# Patient Record
Sex: FEMALE | Race: WHITE | NOT HISPANIC OR LATINO
[De-identification: names, ages, dates, MRNs, and addresses within clinical notes are randomized per-mention and may not be internally consistent; named-entity substitution may affect disease eponyms.]

---

## 2021-05-05 ENCOUNTER — NEW PATIENT COMPREHENSIVE (OUTPATIENT)
Dept: URBAN - METROPOLITAN AREA CLINIC 48 | Facility: CLINIC | Age: 86
End: 2021-05-05

## 2021-05-05 DIAGNOSIS — H25.812: ICD-10-CM

## 2021-05-05 DIAGNOSIS — H31.011: ICD-10-CM

## 2021-05-05 PROCEDURE — 92134 CPTRZ OPH DX IMG PST SGM RTA: CPT

## 2021-05-05 PROCEDURE — 92004 COMPRE OPH EXAM NEW PT 1/>: CPT

## 2021-05-05 RX ORDER — ERYTHROMYCIN 5 MG/G
OINTMENT OPHTHALMIC EVERY EVENING
Start: 2021-05-05

## 2021-05-05 ASSESSMENT — KERATOMETRY
OD_K1POWER_DIOPTERS: 45.00
OS_AXISANGLE2_DEGREES: 90
OS_K2POWER_DIOPTERS: 43.50
OD_AXISANGLE_DEGREES: 090
OD_AXISANGLE2_DEGREES: 180
OS_K1POWER_DIOPTERS: 44.25
OS_AXISANGLE_DEGREES: 180
OD_K2POWER_DIOPTERS: 42.50

## 2021-05-05 ASSESSMENT — VISUAL ACUITY
OU_CC: 20/60+2
OS_GLARE: >20/400
OS_CC: 20/40-2
OU_CC: J1+
OS_CC: J1+

## 2021-05-05 ASSESSMENT — TONOMETRY
OD_IOP_MMHG: 10
OS_IOP_MMHG: 14

## 2021-05-05 NOTE — PATIENT DISCUSSION
VISUALLY SIGNIFICANT/ DEFERS: Informed patient that their cataract is visually significant and that surgery is recommended to improve patient's visual acuity and/or glare symptoms. RBAs of surgery discussed and questions answered. Patient defers surgery at this time. Will continue to monitor regularly for progression. Patient can call to schedule biometry and surgery within the next 6 months if desired.

## 2021-05-05 NOTE — PATIENT DISCUSSION
Recommended warm compresses 2-3x/day x 10 mins. Begin E-mycin eleanor qhs LLL until resolves (Written Rx given).  Patient to call in 2 weeks if no improvement to schedule possible excision with MD.

## 2021-08-25 ENCOUNTER — DILATED FUNDUS EXAM (OUTPATIENT)
Dept: URBAN - METROPOLITAN AREA CLINIC 50 | Facility: CLINIC | Age: 86
End: 2021-08-25

## 2021-08-25 DIAGNOSIS — H52.4: ICD-10-CM

## 2021-08-25 DIAGNOSIS — H25.812: ICD-10-CM

## 2021-08-25 PROCEDURE — 92014 COMPRE OPH EXAM EST PT 1/>: CPT

## 2021-08-25 PROCEDURE — 92015 DETERMINE REFRACTIVE STATE: CPT

## 2021-08-25 ASSESSMENT — TONOMETRY
OD_IOP_MMHG: 10
OS_IOP_MMHG: 13

## 2021-08-25 ASSESSMENT — KERATOMETRY
OD_K2POWER_DIOPTERS: 42.50
OS_AXISANGLE_DEGREES: 180
OD_AXISANGLE_DEGREES: 090
OS_K2POWER_DIOPTERS: 43.50
OD_AXISANGLE2_DEGREES: 180
OS_AXISANGLE2_DEGREES: 90
OD_K1POWER_DIOPTERS: 45.00
OS_K1POWER_DIOPTERS: 44.25

## 2021-08-25 ASSESSMENT — VISUAL ACUITY
OS_CC: 20/50
OU_CC: J1+
OU_CC: 20/50
OS_GLARE: 20/400

## 2022-02-08 ENCOUNTER — ESTABLISHED PATIENT (OUTPATIENT)
Dept: URBAN - METROPOLITAN AREA CLINIC 52 | Facility: CLINIC | Age: 87
End: 2022-02-08

## 2022-02-08 DIAGNOSIS — H00.015: ICD-10-CM

## 2022-02-08 DIAGNOSIS — H04.123: ICD-10-CM

## 2022-02-08 DIAGNOSIS — H01.006: ICD-10-CM

## 2022-02-08 DIAGNOSIS — H01.003: ICD-10-CM

## 2022-02-08 PROCEDURE — 92012 INTRM OPH EXAM EST PATIENT: CPT

## 2022-02-08 ASSESSMENT — KERATOMETRY
OD_K1POWER_DIOPTERS: 45.00
OD_AXISANGLE_DEGREES: 090
OS_AXISANGLE2_DEGREES: 90
OS_AXISANGLE_DEGREES: 180
OD_K2POWER_DIOPTERS: 42.50
OS_K1POWER_DIOPTERS: 44.25
OD_AXISANGLE2_DEGREES: 180
OS_K2POWER_DIOPTERS: 43.50

## 2022-02-08 ASSESSMENT — TONOMETRY
OS_IOP_MMHG: 14
OD_IOP_MMHG: 14

## 2022-02-08 ASSESSMENT — VISUAL ACUITY
OS_PH: 20/40
OS_CC: 20/60

## 2022-02-23 ENCOUNTER — ESTABLISHED PATIENT (OUTPATIENT)
Dept: URBAN - METROPOLITAN AREA CLINIC 50 | Facility: CLINIC | Age: 87
End: 2022-02-23

## 2022-02-23 DIAGNOSIS — D31.32: ICD-10-CM

## 2022-02-23 DIAGNOSIS — H16.223: ICD-10-CM

## 2022-02-23 DIAGNOSIS — H25.812: ICD-10-CM

## 2022-02-23 DIAGNOSIS — H43.812: ICD-10-CM

## 2022-02-23 DIAGNOSIS — H18.412: ICD-10-CM

## 2022-02-23 PROCEDURE — 92134 CPTRZ OPH DX IMG PST SGM RTA: CPT

## 2022-02-23 PROCEDURE — 92014 COMPRE OPH EXAM EST PT 1/>: CPT

## 2022-02-23 ASSESSMENT — VISUAL ACUITY
OS_PH: 20/40-2
OU_CC: J2 @ 16IN
OS_CC: 20/60
OS_GLARE: 20/400

## 2022-02-23 ASSESSMENT — TONOMETRY
OS_IOP_MMHG: 13
OD_IOP_MMHG: 12

## 2022-02-23 NOTE — PATIENT DISCUSSION
Erythromycin ointment sent to patients pharmacy to use BID for 7-10days. D/w patient that if hordeolum doesn't resolve with this regiment we can prescribe an oral antibiotic.

## 2022-04-07 ENCOUNTER — DIAGNOSTICS ONLY (OUTPATIENT)
Dept: URBAN - METROPOLITAN AREA CLINIC 50 | Facility: CLINIC | Age: 87
End: 2022-04-07

## 2022-04-07 DIAGNOSIS — H25.812: ICD-10-CM

## 2022-04-07 PROCEDURE — 92025IOL CORNEAL TOPOGRAPHY PREMIUM IOL

## 2022-04-07 PROCEDURE — 92136 OPHTHALMIC BIOMETRY: CPT

## 2022-04-07 ASSESSMENT — KERATOMETRY
OS_AXISANGLE_DEGREES: 009
OS_K2POWER_DIOPTERS: 42.62
OS_K1POWER_DIOPTERS: 43.87
OS_AXISANGLE2_DEGREES: 99

## 2022-05-11 ENCOUNTER — PRE-OP/H&P (OUTPATIENT)
Dept: URBAN - METROPOLITAN AREA CLINIC 50 | Facility: CLINIC | Age: 87
End: 2022-05-11

## 2022-05-11 DIAGNOSIS — H25.812: ICD-10-CM

## 2022-05-11 PROCEDURE — PREOP PRE OP VISIT

## 2022-05-11 PROCEDURE — 92134 CPTRZ OPH DX IMG PST SGM RTA: CPT

## 2022-05-11 ASSESSMENT — TONOMETRY
OS_IOP_MMHG: 14
OD_IOP_MMHG: 14

## 2022-05-11 ASSESSMENT — KERATOMETRY
OS_AXISANGLE2_DEGREES: 99
OS_K2POWER_DIOPTERS: 42.62
OS_K1POWER_DIOPTERS: 43.87
OS_AXISANGLE_DEGREES: 009

## 2022-05-11 ASSESSMENT — VISUAL ACUITY: OS_CC: 20/60

## 2022-05-11 NOTE — PATIENT DISCUSSION
CATARACT SURGERY PLANNER - TORIC IOL/+FEMTO: Phacoemulsification with IOL: Eye: OS|DOS: 05/24/2022|Model: [id number]|Power: +15.00|Target: PLANO|Femto: YES|Arcs: - @ 005 ; - @ 185|Axis: 005|Visc: DUET|Omidria: YES|10% Phenylephrine: YES|Epi-shugarcaine:YES|Phaco Setting: STANDARD|BSS+: NO|Trypan Blue: NO|CTR: NO|Olive Tip: NO|Atropine: NO|Pupilloplasty: NO|Notes: H/O STROKE. DEMENTIA. ADHESIVE TAPE ALLERGY. NIDDM. LATE ARRIVAL.

## 2022-05-23 ASSESSMENT — KERATOMETRY
OS_K1POWER_DIOPTERS: 43.87
OS_K2POWER_DIOPTERS: 42.62
OS_AXISANGLE2_DEGREES: 99
OS_AXISANGLE_DEGREES: 009

## 2022-05-24 ENCOUNTER — POST-OP (OUTPATIENT)
Dept: URBAN - METROPOLITAN AREA CLINIC 52 | Facility: CLINIC | Age: 87
End: 2022-05-24

## 2022-05-24 ENCOUNTER — SURGERY/PROCEDURE (OUTPATIENT)
Dept: URBAN - METROPOLITAN AREA SURGERY 16 | Facility: SURGERY | Age: 87
End: 2022-05-24

## 2022-05-24 DIAGNOSIS — Z98.42: ICD-10-CM

## 2022-05-24 DIAGNOSIS — H25.812: ICD-10-CM

## 2022-05-24 DIAGNOSIS — H53.001: ICD-10-CM

## 2022-05-24 PROCEDURE — 99199PSUP SUPERIOR VISION PACKAGE

## 2022-05-24 PROCEDURE — 66984 XCAPSL CTRC RMVL W/O ECP: CPT

## 2022-05-24 ASSESSMENT — TONOMETRY
OS_IOP_MMHG: 31
OS_IOP_MMHG: 10

## 2022-05-24 ASSESSMENT — VISUAL ACUITY: OS_SC: 20/70+1

## 2022-05-24 NOTE — PATIENT DISCUSSION
CATARACT SURGERY PLANNER - TORIC IOL/+FEMTO: Phacoemulsification with IOL: Eye: OS|DOS: 05/24/2022|Model: [id number]|Power: +15.00|Target: PLANO|Femto: YES|Arcs: - @ 005 ; - @ 185|Axis: 005|Visc: DUET|Omidria: YES|10% Phenylephrine: YES|Epi-shugarcaine:YES|Phaco Setting: STANDARD|BSS+: NO|Trypan Blue: NO|CTR: NO|Olive Tip: NO|Atropine: NO|Pupilloplasty: NO|Notes: H/O STROKE. DEMENTIA. ADHESIVE TAPE ALLERGY. NIDDM. LATE ARRIVAL.

## 2022-05-24 NOTE — PATIENT DISCUSSION
INCREASE PRED-MOXI- NEPAF TO 3 TIMES A DAY AND FOLLOW DROP SCHEDULE. D/C LID SCRUBS AT THIS TIME. F/U WITH DR. Nereyda Kim IN [facility].

## 2022-05-24 NOTE — PATIENT DISCUSSION
CATARACT SURGERY PLANNER - TORIC IOL/+FEMTO: Phacoemulsification with IOL: Eye: OS|DOS: 05/24/2022|Model: [id number]|Power: +15.00|Target: PLANO|Femto: YES|Arcs: - @ 005 ; - @ 185|Axis: 005|Visc: DUET|Omidria: YES|10% Phenylephrine: YES|Epi-shugarcaine:YES|Phaco Setting: STANDARD|BSS+: NO|Trypan Blue: NO|CTR: NO|Olive Tip: NO|Atropine: NO|Pupilloplasty: NO|Notes: H/O STROKE. DEMENTIA. ADHESIVE TAPE ALLERGY. NIDDM. LATE ARRIVAL.

## 2022-05-24 NOTE — PATIENT DISCUSSION
INCREASE PRED-MOXI- NEPAF TO 3 TIMES A DAY AND FOLLOW DROP SCHEDULE. D/C LID SCRUBS AT THIS TIME. F/U WITH DR. Melody Tello IN [facility].

## 2022-06-02 ENCOUNTER — POST-OP (OUTPATIENT)
Dept: URBAN - METROPOLITAN AREA CLINIC 50 | Facility: CLINIC | Age: 87
End: 2022-06-02

## 2022-06-02 DIAGNOSIS — Z98.42: ICD-10-CM

## 2022-06-02 ASSESSMENT — VISUAL ACUITY
OU_CC: J1+
OU_SC: J2
OS_SC: 20/30-1

## 2022-06-02 ASSESSMENT — TONOMETRY
OD_IOP_MMHG: 15
OS_IOP_MMHG: 16

## 2022-06-29 ENCOUNTER — POST-OP (OUTPATIENT)
Dept: URBAN - METROPOLITAN AREA CLINIC 50 | Facility: CLINIC | Age: 87
End: 2022-06-29

## 2022-06-29 DIAGNOSIS — Z98.42: ICD-10-CM

## 2022-06-29 DIAGNOSIS — H43.812: ICD-10-CM

## 2022-06-29 PROCEDURE — 99024 POSTOP FOLLOW-UP VISIT: CPT

## 2022-06-29 ASSESSMENT — TONOMETRY
OS_IOP_MMHG: 13
OD_IOP_MMHG: 11

## 2022-06-29 ASSESSMENT — VISUAL ACUITY
OS_SC: 20/25
OS_SC: J3 @ 16 IN

## 2022-10-26 ENCOUNTER — ESTABLISHED PATIENT (OUTPATIENT)
Dept: URBAN - METROPOLITAN AREA CLINIC 50 | Facility: CLINIC | Age: 87
End: 2022-10-26

## 2022-10-26 DIAGNOSIS — H16.223: ICD-10-CM

## 2022-10-26 DIAGNOSIS — E11.9: ICD-10-CM

## 2022-10-26 DIAGNOSIS — D31.32: ICD-10-CM

## 2022-10-26 DIAGNOSIS — H31.011: ICD-10-CM

## 2022-10-26 PROCEDURE — 92014 COMPRE OPH EXAM EST PT 1/>: CPT

## 2022-10-26 ASSESSMENT — VISUAL ACUITY
OS_SC: 20/25
OS_SC: 20/25+1

## 2022-10-26 ASSESSMENT — TONOMETRY
OD_IOP_MMHG: 13
OS_IOP_MMHG: 13

## 2023-01-26 NOTE — PATIENT DISCUSSION
Erythromycin ointment sent to patients pharmacy to use BID for 7-10days. D/w patient that if hordeolum doesn't resolve with this regiment we can prescribe an oral antibiotic. Detail Level: Zone

## 2024-02-07 ENCOUNTER — COMPREHENSIVE EXAM (OUTPATIENT)
Dept: URBAN - METROPOLITAN AREA CLINIC 50 | Facility: LOCATION | Age: 89
End: 2024-02-07

## 2024-02-07 DIAGNOSIS — H31.011: ICD-10-CM

## 2024-02-07 DIAGNOSIS — H26.492: ICD-10-CM

## 2024-02-07 DIAGNOSIS — E11.9: ICD-10-CM

## 2024-02-07 PROCEDURE — 92134 CPTRZ OPH DX IMG PST SGM RTA: CPT

## 2024-02-07 PROCEDURE — 92015 DETERMINE REFRACTIVE STATE: CPT

## 2024-02-07 PROCEDURE — 99214 OFFICE O/P EST MOD 30 MIN: CPT

## 2024-02-07 ASSESSMENT — VISUAL ACUITY
OU_SC: 20/25 PUSH
OS_SC: 20/25 PUSH
OU_SC: J1-2
OS_SC: 20/25-1

## 2024-02-07 ASSESSMENT — TONOMETRY
OS_IOP_MMHG: 12
OD_IOP_MMHG: 13

## 2024-11-25 NOTE — PATIENT DISCUSSION
Erythromycin ointment sent to patients pharmacy to use BID for 7-10days. D/w patient that if hordeolum doesn't resolve with this regiment we can prescribe an oral antibiotic. independent

## 2025-02-19 ENCOUNTER — COMPREHENSIVE EXAM (OUTPATIENT)
Age: 89
End: 2025-02-19

## 2025-02-19 DIAGNOSIS — D31.32: ICD-10-CM

## 2025-02-19 DIAGNOSIS — E11.9: ICD-10-CM

## 2025-02-19 DIAGNOSIS — H52.4: ICD-10-CM

## 2025-02-19 DIAGNOSIS — H16.223: ICD-10-CM

## 2025-02-19 DIAGNOSIS — H31.011: ICD-10-CM

## 2025-02-19 DIAGNOSIS — H26.492: ICD-10-CM

## 2025-02-19 DIAGNOSIS — H18.412: ICD-10-CM

## 2025-02-19 DIAGNOSIS — H43.812: ICD-10-CM

## 2025-02-19 PROCEDURE — 99214 OFFICE O/P EST MOD 30 MIN: CPT

## 2025-02-19 PROCEDURE — 92134 CPTRZ OPH DX IMG PST SGM RTA: CPT
